# Patient Record
Sex: FEMALE | Race: WHITE
[De-identification: names, ages, dates, MRNs, and addresses within clinical notes are randomized per-mention and may not be internally consistent; named-entity substitution may affect disease eponyms.]

---

## 2021-11-23 ENCOUNTER — HOSPITAL ENCOUNTER (EMERGENCY)
Dept: HOSPITAL 63 - ER | Age: 26
Discharge: HOME | End: 2021-11-23
Payer: COMMERCIAL

## 2021-11-23 VITALS — HEIGHT: 64 IN | BODY MASS INDEX: 19.2 KG/M2 | WEIGHT: 112.44 LBS

## 2021-11-23 VITALS — SYSTOLIC BLOOD PRESSURE: 118 MMHG | DIASTOLIC BLOOD PRESSURE: 70 MMHG

## 2021-11-23 DIAGNOSIS — R63.0: ICD-10-CM

## 2021-11-23 DIAGNOSIS — R10.31: Primary | ICD-10-CM

## 2021-11-23 DIAGNOSIS — R11.0: ICD-10-CM

## 2021-11-23 LAB
ALBUMIN SERPL-MCNC: 4 G/DL (ref 3.4–5)
ALBUMIN/GLOB SERPL: 1 {RATIO} (ref 1–1.7)
ALP SERPL-CCNC: 55 U/L (ref 46–116)
ALT SERPL-CCNC: 15 U/L (ref 14–59)
ANION GAP SERPL CALC-SCNC: 12 MMOL/L (ref 6–14)
APTT PPP: YELLOW S
AST SERPL-CCNC: 14 U/L (ref 15–37)
BACTERIA #/AREA URNS HPF: (no result) /HPF
BASOPHILS # BLD AUTO: 0 X10^3/UL (ref 0–0.2)
BASOPHILS NFR BLD: 1 % (ref 0–3)
BILIRUB SERPL-MCNC: 0.3 MG/DL (ref 0.2–1)
BILIRUB UR QL STRIP: (no result)
BUN/CREAT SERPL: 9 (ref 6–20)
CA-I SERPL ISE-MCNC: 7 MG/DL (ref 7–20)
CALCIUM SERPL-MCNC: 9.1 MG/DL (ref 8.5–10.1)
CHLORIDE SERPL-SCNC: 105 MMOL/L (ref 98–107)
CO2 SERPL-SCNC: 24 MMOL/L (ref 21–32)
CREAT SERPL-MCNC: 0.8 MG/DL (ref 0.6–1)
EOSINOPHIL NFR BLD: 0.1 X10^3/UL (ref 0–0.7)
EOSINOPHIL NFR BLD: 2 % (ref 0–3)
ERYTHROCYTE [DISTWIDTH] IN BLOOD BY AUTOMATED COUNT: 13.1 % (ref 11.5–14.5)
FIBRINOGEN PPP-MCNC: (no result) MG/DL
GFR SERPLBLD BASED ON 1.73 SQ M-ARVRAT: 87.4 ML/MIN
GLOBULIN SER-MCNC: 4 G/DL (ref 2.2–3.8)
GLUCOSE SERPL-MCNC: 82 MG/DL (ref 70–99)
GLUCOSE UR STRIP-MCNC: (no result) MG/DL
HCT VFR BLD CALC: 39.6 % (ref 36–47)
HGB BLD-MCNC: 13.5 G/DL (ref 12–15.5)
LIPASE: 108 U/L (ref 73–393)
LYMPHOCYTES # BLD: 2.2 X10^3/UL (ref 1–4.8)
LYMPHOCYTES NFR BLD AUTO: 32 % (ref 24–48)
MCH RBC QN AUTO: 31 PG (ref 25–35)
MCHC RBC AUTO-ENTMCNC: 34 G/DL (ref 31–37)
MCV RBC AUTO: 90 FL (ref 79–100)
MONO #: 0.3 X10^3/UL (ref 0–1.1)
MONOCYTES NFR BLD: 4 % (ref 0–9)
NEUT #: 4.3 X10^3UL (ref 1.8–7.7)
NEUTROPHILS NFR BLD AUTO: 62 % (ref 31–73)
NITRITE UR QL STRIP: (no result)
PLATELET # BLD AUTO: 233 X10^3/UL (ref 140–400)
POTASSIUM SERPL-SCNC: 3.4 MMOL/L (ref 3.5–5.1)
PROT SERPL-MCNC: 8 G/DL (ref 6.4–8.2)
RBC # BLD AUTO: 4.42 X10^6/UL (ref 3.5–5.4)
RBC #/AREA URNS HPF: (no result) /HPF (ref 0–2)
SODIUM SERPL-SCNC: 141 MMOL/L (ref 136–145)
SP GR UR STRIP: 1.02
SQUAMOUS #/AREA URNS LPF: (no result) /LPF
UROBILINOGEN UR-MCNC: 0.2 MG/DL
WBC # BLD AUTO: 6.9 X10^3/UL (ref 4–11)
WBC #/AREA URNS HPF: (no result) /HPF (ref 0–4)

## 2021-11-23 PROCEDURE — 81001 URINALYSIS AUTO W/SCOPE: CPT

## 2021-11-23 PROCEDURE — 85025 COMPLETE CBC W/AUTO DIFF WBC: CPT

## 2021-11-23 PROCEDURE — 74177 CT ABD & PELVIS W/CONTRAST: CPT

## 2021-11-23 PROCEDURE — 83690 ASSAY OF LIPASE: CPT

## 2021-11-23 PROCEDURE — 96361 HYDRATE IV INFUSION ADD-ON: CPT

## 2021-11-23 PROCEDURE — 93976 VASCULAR STUDY: CPT

## 2021-11-23 PROCEDURE — 87086 URINE CULTURE/COLONY COUNT: CPT

## 2021-11-23 PROCEDURE — 36415 COLL VENOUS BLD VENIPUNCTURE: CPT

## 2021-11-23 PROCEDURE — 96374 THER/PROPH/DIAG INJ IV PUSH: CPT

## 2021-11-23 PROCEDURE — 81025 URINE PREGNANCY TEST: CPT

## 2021-11-23 PROCEDURE — 96375 TX/PRO/DX INJ NEW DRUG ADDON: CPT

## 2021-11-23 PROCEDURE — 99285 EMERGENCY DEPT VISIT HI MDM: CPT

## 2021-11-23 PROCEDURE — 80053 COMPREHEN METABOLIC PANEL: CPT

## 2021-11-23 NOTE — RAD
Exam Date:  11/23/2021 1:16 PM



US ABDOMEN LIMITED



Indication: Reason: RLQ pain / Spl. Instructions:  / History: .



TECHNIQUE: Sonographic images were obtained of the right lower quadrant.



IMPRESSION:



Normal-appearing bowel with peristalsis is seen in the right lower quadrant.  The appendix is not shar
ntified with certainty.  No definite inflammatory changes or collection is seen in the right lower qu
adrant.  No focal abnormality seen.



Electronically signed by: Bony Watson MD (11/23/2021 1:29 PM) ANA

## 2021-11-23 NOTE — RAD
EXAM: CT ABDOMEN/PELVIS WITH CONTRAST.



HISTORY: Right lower quadrant pain.



TECHNIQUE: Computed tomography of the abdomen and pelvis was performed after the intravenous administ
ration of iodinated contrast. One or more of the following individualized dose reduction techniques w
ere utilized for this examination:  

1. Automated exposure control.  

2. Adjustment of the mA and/or kV according to patient size.  

3. Use of iterative reconstruction technique.



COMPARISON: None.



FINDINGS: Lung windows through the visualized portions of the bases reveal mild atelectasis. Bone win
dows reveal no suspicious lesions.



The liver, gallbladder, pancreas, spleen, adrenal glands and kidneys are unremarkable. There are no p
athologically enlarged lymph nodes.



A contraceptive device is noted in the vagina. A small amount of free pelvic fluid is likely physiolo
gic. The uterus and ovaries are unremarkable by CT.



There is no evidence of appendicitis. There is no small bowel obstruction.



IMPRESSION: 

1. A small amount of free pelvic fluid is likely physiologic. No acute intra-abdominal findings.



Electronically signed by: TONY Darnell MD (11/23/2021 2:43 PM) [id number]

## 2021-11-23 NOTE — PHYS DOC
Past History


Past Surgical History:  No Surgical History


 (EDDIE VILLASEÑOR)


Alcohol Use:  None


 (EDDIE VILLASEÑOR)





General Adult


EDM:


Chief Complaint:  ABDOMINAL PAIN





HPI:


HPI:





Patient is a 26 year old female who presents with abdominal pain.  She reports 

that yesterday, her abdominal pain was generalized, but woke up this morning 

with right lower quadrant pain 6/10.  She was visited at urgent care today, who 

would advise that she visit the emergency department.  Patient reports a history

of right ovarian cysts, the first of which occurred when she was 12 years old.  

She states this pain is "more dull and achy" compared to sharp pain from ovarian

cysts.  Patient reports associated nausea and anorexia, denies fever, chills, 

vomiting, diarrhea, hematuria, dysuria.  Patient has had no prior abdominal 

surgeries and has no chronic medical problems.


 (EDDIE VILLASEÑOR)





Review of Systems:


Review of Systems:


Constitutional:  See HPI


Eyes:  Denies change in visual acuity or visual field deficits


HENT:  Denies nasal congestion or sore throat 


Respiratory:  Denies cough or shortness of breath 


Cardiovascular:  Denies chest pain or edema 


GI:  See HPI


: See HPI


Musculoskeletal:  Denies back pain or joint pain 


Integument:  Denies rash or other skin lesions


Neurologic:  Denies headache, focal weakness or sensory changes 


 (EDDIE VILLASEÑOR)





Current Medications:


Current Meds:





Current Medications








 Medications


  (Trade)  Dose


 Ordered  Sig/Greg  Start Time


 Stop Time Status Last Admin


Dose Admin


 


 Info


  (Do NOT chart on


 this entry -- for


 MONITORING)  1 each  PRN DAILY  PRN  11/23/21 13:45


 11/25/21 13:44   





 


 Iohexol


  (Omnipaque 300


 Mg/ml)  75 ml  1X  ONCE  11/23/21 13:30


 11/23/21 13:31 DC 11/23/21 13:57


75 ML


 


 Morphine Sulfate


  (Morphine 2mg


 Syringe)  2 mg  1X  ONCE  11/23/21 13:00


 11/23/21 13:04 DC 11/23/21 13:46


2 MG


 


 Ondansetron HCl


  (Zofran)  4 mg  1X  ONCE  11/23/21 13:00


 11/23/21 13:04 DC 11/23/21 13:45


4 MG


 


 Sodium Chloride  1,000 ml @ 


 1,000 mls/hr  1X  ONCE  11/23/21 13:00


 11/23/21 13:59 DC 11/23/21 13:45


1,000 MLS/HR








 (EDDIE VILLASEÑOR)





Allergies:


Allergies:





Allergies








Coded Allergies Type Severity Reaction Last Updated Verified


 


  No Known Drug Allergies    11/23/21 No








 (EDDIE VILLASEÑOR)





Physical Exam:


PE:





Constitutional: Well developed, well nourished, non-toxic appearance, patient 

lying curled on exam bed. 


Cardiovascular: Heart rate regular rhythm, no murmur.


Lungs & Thorax: Bilateral breath sounds clear to auscultation.


Abdomen: Bowel sounds normal, soft, McBurney's point tenderness appreciated, no 

rebound tenderness, no masses, no pulsatile masses. 


Skin: Warm, dry, no erythema, no rash. 


Back: No tenderness, no CVA tenderness. 


Extremities: No tenderness, no cyanosis, no clubbing, ROM intact, no edema. 


Neurologic: Alert and oriented x4, motor function grossly intact, sensory 

function grossly intact, no focal deficits noted. 


 (EDDIE VILLASEÑOR)





Current Patient Data:


Labs:





                                Laboratory Tests








Test


 11/23/21


12:16 11/23/21


12:22 11/23/21


13:02


 


Urine Collection Type Unknown    


 


Urine Color Yellow    


 


Urine Clarity Hazy    


 


Urine pH 6.0    


 


Urine Specific Gravity 1.025    


 


Urine Protein


 Neg


(NEG-TRACE) 


 





 


Urine Glucose (UA)


 Neg mg/dL


(NEG) 


 





 


Urine Ketones (Stick)


 Neg mg/dL


(NEG) 


 





 


Urine Blood Neg (NEG)    


 


Urine Nitrite Neg (NEG)    


 


Urine Bilirubin Neg (NEG)    


 


Urine Urobilinogen Dipstick


 0.2 mg/dL (0.2


mg/dL) 


 





 


Urine Leukocyte Esterase Neg (NEG)    


 


Urine RBC


 3-5 /HPF (0-2)


 


 





 


Urine WBC


 1-4 /HPF (0-4)


 


 





 


Urine Squamous Epithelial


Cells Many /LPF  


 


 





 


Urine Bacteria


 Mod /HPF


(0-FEW) 


 





 


Urine Mucus Mod /LPF    


 


POC Urine HCG, Qualitative


 


 hcg negative


(Negative) 





 


White Blood Count


 


 


 6.9 x10^3/uL


(4.0-11.0)


 


Red Blood Count


 


 


 4.42 x10^6/uL


(3.50-5.40)


 


Hemoglobin


 


 


 13.5 g/dL


(12.0-15.5)


 


Hematocrit


 


 


 39.6 %


(36.0-47.0)


 


Mean Corpuscular Volume


 


 


 90 fL ()





 


Mean Corpuscular Hemoglobin   31 pg (25-35)  


 


Mean Corpuscular Hemoglobin


Concent 


 


 34 g/dL


(31-37)


 


Red Cell Distribution Width


 


 


 13.1 %


(11.5-14.5)


 


Platelet Count


 


 


 233 x10^3/uL


(140-400)


 


Neutrophils (%) (Auto)   62 % (31-73)  


 


Lymphocytes (%) (Auto)   32 % (24-48)  


 


Monocytes (%) (Auto)   4 % (0-9)  


 


Eosinophils (%) (Auto)   2 % (0-3)  


 


Basophils (%) (Auto)   1 % (0-3)  


 


Neutrophils # (Auto)


 


 


 4.3 x10^3uL


(1.8-7.7)


 


Lymphocytes # (Auto)


 


 


 2.2 x10^3/uL


(1.0-4.8)


 


Monocytes # (Auto)


 


 


 0.3 x10^3/uL


(0.0-1.1)


 


Eosinophils # (Auto)


 


 


 0.1 x10^3/uL


(0.0-0.7)


 


Basophils # (Auto)


 


 


 0.0 x10^3/uL


(0.0-0.2)


 


Sodium Level


 


 


 141 mmol/L


(136-145)


 


Potassium Level


 


 


 3.4 mmol/L


(3.5-5.1)  L


 


Chloride Level


 


 


 105 mmol/L


()


 


Carbon Dioxide Level


 


 


 24 mmol/L


(21-32)


 


Anion Gap   12 (6-14)  


 


Blood Urea Nitrogen


 


 


 7 mg/dL (7-20)





 


Creatinine


 


 


 0.8 mg/dL


(0.6-1.0)


 


Estimated GFR


(Cockcroft-Gault) 


 


 87.4  





 


BUN/Creatinine Ratio   9 (6-20)  


 


Glucose Level


 


 


 82 mg/dL


(70-99)


 


Calcium Level


 


 


 9.1 mg/dL


(8.5-10.1)


 


Total Bilirubin


 


 


 0.3 mg/dL


(0.2-1.0)


 


Aspartate Amino Transferase


(AST) 


 


 14 U/L (15-37)


L


 


Alanine Aminotransferase (ALT)


 


 


 15 U/L (14-59)





 


Alkaline Phosphatase


 


 


 55 U/L


()


 


Total Protein


 


 


 8.0 g/dL


(6.4-8.2)


 


Albumin


 


 


 4.0 g/dL


(3.4-5.0)


 


Albumin/Globulin Ratio   1.0 (1.0-1.7)  


 


Lipase


 


 


 108 U/L


()








Vital Signs:





                                   Vital Signs








  Date Time  Temp Pulse Resp B/P (MAP) Pulse Ox O2 Delivery O2 Flow Rate FiO2


 


11/23/21 14:50   16  97 Room Air  


 


11/23/21 12:27 98.0 75  124/75 (91)    








 (EDDIE VILLASEÑOR)





Radiology/Procedures:


Radiology/Procedures:


PROCEDURE: RIGHT LOWER QUANDRANT





Exam Date:  11/23/2021 1:16 PM





US ABDOMEN LIMITED





Indication: Reason: RLQ pain / Spl. Instructions:  / History: .





TECHNIQUE: Sonographic images were obtained of the right lower quadrant.





IMPRESSION:





Normal-appearing bowel with peristalsis is seen in the right lower quadrant.  

The appendix is not identified with certainty.  No definite inflammatory changes

or collection is seen in the right lower quadrant.  No focal abnormality seen.





Electronically signed by: Bony Watson MD (11/23/2021 1:29 PM) [id number]





PROCEDURE: CT ABD PELV W/ IV CONTRST ONLY





EXAM: CT ABDOMEN/PELVIS WITH CONTRAST.





HISTORY: Right lower quadrant pain.





TECHNIQUE: Computed tomography of the abdomen and pelvis was performed after the

intravenous administration of iodinated contrast. One or more of the following 

individualized dose reduction techniques were utilized for this examination:  


1. Automated exposure control.  


2. Adjustment of the mA and/or kV according to patient size.  


3. Use of iterative reconstruction technique.





COMPARISON: None.





FINDINGS: Lung windows through the visualized portions of the bases reveal mild 

atelectasis. Bone windows reveal no suspicious lesions.





The liver, gallbladder, pancreas, spleen, adrenal glands and kidneys are 

unremarkable. There are no pathologically enlarged lymph nodes.





A contraceptive device is noted in the vagina. A small amount of free pelvic 

fluid is likely physiologic. The uterus and ovaries are unremarkable by CT.





There is no evidence of appendicitis. There is no small bowel obstruction.





IMPRESSION: 


1. A small amount of free pelvic fluid is likely physiologic. No acute intra-

abdominal findings.





Electronically signed by: TONY Darnell MD (11/23/2021 2:43 PM) [id number]


 (EDDIE VILLASEÑOR)





Heart Score:


C/O Chest Pain:  No


 (EDDIE VILLASEÑOR)





Course & Med Decision Making:


Course & Med Decision Making


Pertinent Labs and Imaging studies reviewed. (See chart for details)





Patient history and presentation concerning for early appendicitis versus right 

adnexal pathology.  Ultrasound ordered to evaluate.





Ultrasound inconclusive.  CT imaging obtained.





Imaging studies negative.  Patient will be discharged home with prescription for

Zofran and instruction for clear liquid diet, advance as tolerated.  Patient 

understands and is agreeable to discharge plan.


 (EDDIE VILLASEÑOR)





Dragon Disclaimer:


Dragon Disclaimer:


This electronic medical record was generated, in whole or in part, using a voice

recognition dictation system.


 (EDDIE VILLASEÑOR)





Departure


Departure:


Impression:  


   Primary Impression:  


   Abdominal pain


   Qualified Codes:  R10.9 - Unspecified abdominal pain


Disposition:  01 HOME / SELF CARE / HOMELESS


Condition:  STABLE


Referrals:  


NU AKINS NP (PCP)


Patient Instructions:  Viral Gastroenteritis, Easy-to-Read


Scripts


Ondansetron (ONDANSETRON ODT) 4 Mg Tab.rapdis


1 TAB PO PRN Q6-8HRS for nausea, #20 TAB


   Prov: EDDIE VILLASEÑOR         11/23/21





Attending Signature


Attending Signature


I have participated in the care of this patient and I have reviewed and agree wi

th all pertinent clinical information above including history, exam, and 

recommendations.





 (MARTHA GUEVARA DO)











EDDIE VILLASEÑOR              Nov 23, 2021 15:13


MARTHA GUEVARA DO             Nov 23, 2021 22:09